# Patient Record
Sex: FEMALE | Race: ASIAN | NOT HISPANIC OR LATINO | URBAN - METROPOLITAN AREA
[De-identification: names, ages, dates, MRNs, and addresses within clinical notes are randomized per-mention and may not be internally consistent; named-entity substitution may affect disease eponyms.]

---

## 2017-07-07 ENCOUNTER — EMERGENCY (EMERGENCY)
Facility: HOSPITAL | Age: 37
LOS: 1 days | Discharge: HOME | End: 2017-07-07
Attending: EMERGENCY MEDICINE | Admitting: EMERGENCY MEDICINE
Payer: COMMERCIAL

## 2017-07-07 VITALS
OXYGEN SATURATION: 100 % | DIASTOLIC BLOOD PRESSURE: 62 MMHG | SYSTOLIC BLOOD PRESSURE: 113 MMHG | TEMPERATURE: 98 F | HEART RATE: 86 BPM | RESPIRATION RATE: 20 BRPM

## 2017-07-07 PROCEDURE — 99284 EMERGENCY DEPT VISIT MOD MDM: CPT

## 2017-07-07 RX ORDER — ONDANSETRON 8 MG/1
4 TABLET, FILM COATED ORAL ONCE
Qty: 0 | Refills: 0 | Status: DISCONTINUED | OUTPATIENT
Start: 2017-07-07 | End: 2017-07-07

## 2017-07-07 NOTE — ED PROVIDER NOTE - PROGRESS NOTE DETAILS
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 states "its not that bad I just had a feeling I was pregnant". Has OBGYN follow up, will have serum hcg and US done with GYN given no complaints or symptoms in ED.

## 2017-07-07 NOTE — ED PROVIDER NOTE - CHPI ED SYMPTOMS NEG
no vomiting/no dysuria/no fever/no diarrhea/no abdominal distension/no blood in stool/no hematuria/no chills/no burning urination

## 2017-07-07 NOTE — ED PROVIDER NOTE - CARE PLAN
Principal Discharge DX:	Positive pregnancy test  Instructions for follow-up, activity and diet:	Follow up with your OBGYN for blood pregnancy test and ultrasound. Rest, drink plenty of fluids.  Advance activity as tolerated.  Continue all previously prescribed medications as directed. You can use motrin 600mg every 6-8 hours for pain or fever, and/or Tylenol 650 mg every 4 hours for pain/fever. Follow up with your primary care physician in 48-72 hours- bring copies of your results.  Return to the emergency department for chest pain, shortness of breath, dizziness, or worsening, concerning, or persistent symptoms.

## 2017-07-07 NOTE — ED PROVIDER NOTE - MEDICAL DECISION MAKING DETAILS
35 YO F here for nausea and late period. otherwise well. pt concerned for pregnancy, also mentions post viral cough. Abdomen is soft and non tender, denies pelvic pain or cramping and denies GYN complaints. Able to tolerate PO. Lungs CTABL. Cough likely post viral cough given hx of URI sxs 2 weeks ago, no SOB or distress, states cough is mild but annoying. Nausea could be pregnancy related vs. GERD vs. AGE, given benign abdominal exam no concern for AP or surgical abdominal pathology. PLAN: UCG to r/o pregnancy, Gi cocktail, possible neb for post viral cough.

## 2017-07-07 NOTE — ED PROVIDER NOTE - ATTENDING CONTRIBUTION TO CARE
ED Attending (Riley HEATH): I have personally performed a face to face bedside history and physical examination of this patient. I have discussed the history, examination, assessment and plan of management with the Physician Assistant. My findings include: 35 yo F no pmhx here for mild nausea and late period. Pt reports LMP was at the end of May, typically gets period every 28 days plus or minus 1-2 days.  Has noticed over the past two weeks she is late for her period and she is feeling intermittently nauseous. Able to tolerate PO. Normal UOP and BMs. A 2 weeks ago had URI symptoms which have resolved and now has mild residual cough. Otherwise states she feels well. Has OBGYN and PMD follow up. Came to ED today for symptoms because she had off. Denies fever chills vomiting diarrhea vaginal discharge or bleeding dysuria HA SOB weakness numbness tingling abdominal pain pelvic pain cramping. Denies travel/sick contacts. Sexually active with 1 male partner () denies hx of STI or ectopic pregnancy. Unremarkable exam as  noted. + UCG. Likely symptoms from early pregnancy 6-7 weeks by dates. No indication for emergency imaging or labs. Will see OB.

## 2017-07-07 NOTE — ED PROVIDER NOTE - OBJECTIVE STATEMENT
35 yo F no pmhx here for mild nausea and late period. Pt reports LMP was at the end of May, typically gets period every 28 days plus or minus 1-2 days.  Has noticed over the past two weeks she is late for her period and she is feeling intermittently nauseous. Able to tolerate PO. Normal UOP and BMs. A 2 weeks ago had URI symptoms which have resolved and now has mild residual cough. Otherwise states she feels well. Has OBGYN and PMD follow up. Came to ED today for symptoms because she had off. Denies fever chills vomiting diarrhea vaginal discharge or bleeding dysuria HA SOB weakness numbness tingling abdominal pain pelvic pain cramping. Denies travel/sick contacts. Sexually active with 1 male partner () denies hx of STI or ectopic pregnancy

## 2017-07-07 NOTE — ED PROVIDER NOTE - PLAN OF CARE
Follow up with your OBGYN for blood pregnancy test and ultrasound. Rest, drink plenty of fluids.  Advance activity as tolerated.  Continue all previously prescribed medications as directed. You can use motrin 600mg every 6-8 hours for pain or fever, and/or Tylenol 650 mg every 4 hours for pain/fever. Follow up with your primary care physician in 48-72 hours- bring copies of your results.  Return to the emergency department for chest pain, shortness of breath, dizziness, or worsening, concerning, or persistent symptoms.